# Patient Record
(demographics unavailable — no encounter records)

---
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
## 2025-07-02 NOTE — ASSESSMENT
[FreeTextEntry1] : EXAM Right wrist with mild swelling; no erythema; no ecchymosis. +ttp at volar forearm with ecchymosis, full elbow, wrist and finger ROM. Able to make a composite fist, oppose thumb to small finger and abduct fingers. <2sec cap refill.  Right forearm radiographs with no fracture nor dislocation. Carpus alignment intact. (3-view as viewed by me from outside facility)  ASSESSMENT & PLAN Right volar forearm contusion - reviewed radiographs and pathoanatomy with the patient. Discussed management to consist of NSAIDs prn, wrist brace prn, elevation, minimize use.   F/u 2-3 weeks to reassess